# Patient Record
Sex: FEMALE | Race: WHITE | URBAN - METROPOLITAN AREA
[De-identification: names, ages, dates, MRNs, and addresses within clinical notes are randomized per-mention and may not be internally consistent; named-entity substitution may affect disease eponyms.]

---

## 2024-11-13 ENCOUNTER — TELEPHONE (OUTPATIENT)
Dept: TRANSPLANT | Facility: HOSPITAL | Age: 69
End: 2024-11-13
Payer: COMMERCIAL

## 2024-11-13 ENCOUNTER — DOCUMENTATION (OUTPATIENT)
Dept: TRANSPLANT | Facility: HOSPITAL | Age: 69
End: 2024-11-13
Payer: COMMERCIAL

## 2024-11-13 VITALS — HEIGHT: 65 IN | BODY MASS INDEX: 27.32 KG/M2 | WEIGHT: 164 LBS

## 2024-11-13 NOTE — PROGRESS NOTES
Do you have difficulty reading or writing in English?   no  Do you have any visual or hearing difficulties?  yes, Vision  Do you have transportation to and from your medical appointments?   yes  What type of transportation do you use?  Family(Sister, Nephew)  Who will be coming with you to your transplant appointments?  Sister  Are they able to drive?   no  How far can you walk?  1 or 2 Blocks  Do you use any assistive devices for walking? (Cane, walker, wheelchair, motorized scooter)  Rollator  Who is your primary care doctor?  Dr. Pee Morillo  Who is your kidney doctor?  Dr. Iggy Eaton  What is the primary cause of your kidney disease?  Diabetes  Are you currently on dialysis?   no  If yes, what days do you have your dialysis treatments?   N/A  How many years have you been on dialysis? N/A  Have you received a transplant before?   yes  If yes, what organ, and when and where was your transplant? Kidney- 2011- [facility].  Have you been diagnosed with diabetes?    yes  Do you currently have any open sores or wounds?   no  If yes, where?   N/A  Have you ever had any amputations?  no  If yes, where?   N/A  Do you have any history of blood clots in the legs or lungs?   no  If yes, any known family clotting disorders?   no  Have you ever had a blood transfusion?   yes  If yes, how many and when was the last one? Less than 5, Last one 2023.  Have you ever been diagnosed with Sickle Cell Disease?   no  Have you tested positive for hepatitis or HIV?   no  Have you ever been diagnosed with cancer?   yes  If yes, what type of cancer, and when and where were you treated?  Skin Cancer-2016 or 2017. Treated at [facility]  Do you have a history of a heart attack or stroke?   no  Are you currently using any supplemental oxygen?   no  Have you been hospitalized for any reason in the last year?   yes  If yes, what were you hospitalized for and where were you hospitalized?  8/24( Right side Pain) [facility] ([facility]  [place], [place]  Are you currently or have you previously been seen by a mental health professional?   no  If yes, what is the name of your mental health provider? N/A  Are you a current or former tobacco user?   no  Do you have history of alcohol abuse or dependence?   no  Do you have a history of illegal drug abuse or dependence?   no  Has anyone told you they're willing to donate their kidney to you?   no  Are you currently in evaluation or listed at another transplant center?   no  If yes, where? N/A  Comments:  Intake complete.  Pt. Pre- Dialysis  Referral Under Review.    PCP- Dr. Pee Morillo MD  [address], [place] [address] [phone]

## 2024-11-14 ENCOUNTER — TELEPHONE (OUTPATIENT)
Dept: TRANSPLANT | Facility: HOSPITAL | Age: 69
End: 2024-11-14
Payer: COMMERCIAL

## 2024-11-14 DIAGNOSIS — N18.6 ESRD (END STAGE RENAL DISEASE) (MULTI): Primary | ICD-10-CM

## 2024-11-26 ENCOUNTER — DOCUMENTATION (OUTPATIENT)
Dept: TRANSPLANT | Facility: HOSPITAL | Age: 69
End: 2024-11-26
Payer: COMMERCIAL

## 2024-11-26 NOTE — Clinical Note
Per EV [facility] Dual Plan &  Comm Plan [facility]CO active & pre dialysis.  Opened case with [employer]  & faxed clinical to  pending ref# [id number].

## 2024-11-26 NOTE — PROGRESS NOTES
Per EV [facility] Dual Plan &  Comm Plan MCO active & pre dialysis.  Opened case with [employer]  & faxed clinical to  pending ref# [id number].

## 2024-11-27 ENCOUNTER — TELEPHONE (OUTPATIENT)
Dept: TRANSPLANT | Facility: HOSPITAL | Age: 69
End: 2024-11-27

## 2024-11-27 NOTE — TELEPHONE ENCOUNTER
Confirmed kidney eval appt day for:12.4.24  Reviewed appointment plans for the day: YES  Challenges with transportation to appointments? NO  This could feel like a long day…Any concerns? NO  Bringing support person: YES

## 2024-12-04 ENCOUNTER — DOCUMENTATION (OUTPATIENT)
Dept: TRANSPLANT | Facility: HOSPITAL | Age: 69
End: 2024-12-04
Payer: COMMERCIAL

## 2024-12-04 ENCOUNTER — APPOINTMENT (OUTPATIENT)
Dept: TRANSPLANT | Facility: HOSPITAL | Age: 69
End: 2024-12-04
Payer: MEDICARE

## 2024-12-04 ENCOUNTER — DOCUMENTATION (OUTPATIENT)
Dept: TRANSPLANT | Facility: HOSPITAL | Age: 69
End: 2024-12-04

## 2024-12-04 ENCOUNTER — LAB (OUTPATIENT)
Dept: LAB | Facility: LAB | Age: 69
End: 2024-12-04
Payer: MEDICARE

## 2024-12-04 ENCOUNTER — OFFICE VISIT (OUTPATIENT)
Dept: TRANSPLANT | Facility: HOSPITAL | Age: 69
End: 2024-12-04
Payer: MEDICARE

## 2024-12-04 VITALS
HEART RATE: 79 BPM | OXYGEN SATURATION: 90 % | DIASTOLIC BLOOD PRESSURE: 57 MMHG | BODY MASS INDEX: 27.06 KG/M2 | WEIGHT: 162.6 LBS | TEMPERATURE: 94.8 F | SYSTOLIC BLOOD PRESSURE: 111 MMHG

## 2024-12-04 VITALS
SYSTOLIC BLOOD PRESSURE: 111 MMHG | OXYGEN SATURATION: 90 % | BODY MASS INDEX: 27.06 KG/M2 | DIASTOLIC BLOOD PRESSURE: 57 MMHG | WEIGHT: 162.6 LBS | TEMPERATURE: 94.8 F | HEART RATE: 79 BPM

## 2024-12-04 DIAGNOSIS — T86.12 FAILED KIDNEY TRANSPLANT (HHS-HCC): ICD-10-CM

## 2024-12-04 DIAGNOSIS — D63.1 ANEMIA IN ESRD (END-STAGE RENAL DISEASE) (MULTI): ICD-10-CM

## 2024-12-04 DIAGNOSIS — N18.6 ESRD (END STAGE RENAL DISEASE) (MULTI): Primary | ICD-10-CM

## 2024-12-04 DIAGNOSIS — Z01.818 PRE-TRANSPLANT EVALUATION FOR KIDNEY TRANSPLANT: Primary | ICD-10-CM

## 2024-12-04 DIAGNOSIS — N18.6 ESRD (END STAGE RENAL DISEASE) (MULTI): ICD-10-CM

## 2024-12-04 DIAGNOSIS — Z94.0 HISTORY OF KIDNEY TRANSPLANT (HHS-HCC): ICD-10-CM

## 2024-12-04 DIAGNOSIS — N18.6 ANEMIA IN ESRD (END-STAGE RENAL DISEASE) (MULTI): ICD-10-CM

## 2024-12-04 DIAGNOSIS — Z01.818 PRE-TRANSPLANT EVALUATION FOR KIDNEY TRANSPLANT: ICD-10-CM

## 2024-12-04 PROBLEM — I73.9 PERIPHERAL VASCULAR DISEASE (CMS-HCC): Status: ACTIVE | Noted: 2018-12-20

## 2024-12-04 PROBLEM — R60.0 BILATERAL LOWER EXTREMITY EDEMA: Status: ACTIVE | Noted: 2023-11-24

## 2024-12-04 PROBLEM — E11.51 TYPE 2 DIABETES MELLITUS WITH PERIPHERAL ANGIOPATHY: Status: ACTIVE | Noted: 2018-09-20

## 2024-12-04 PROBLEM — I10 ESSENTIAL HYPERTENSION, MALIGNANT: Status: ACTIVE | Noted: 2018-12-20

## 2024-12-04 PROBLEM — E78.5 HLD (HYPERLIPIDEMIA): Status: ACTIVE | Noted: 2024-07-19

## 2024-12-04 PROBLEM — B35.1 ONYCHOMYCOSIS: Status: ACTIVE | Noted: 2018-09-20

## 2024-12-04 PROBLEM — N25.81 SECONDARY HYPERPARATHYROIDISM OF RENAL ORIGIN (MULTI): Status: ACTIVE | Noted: 2022-06-24

## 2024-12-04 PROBLEM — N18.5 ANEMIA DUE TO STAGE 5 CHRONIC KIDNEY DISEASE TREATED WITH ERYTHROPOIETIN (MULTI): Status: ACTIVE | Noted: 2024-09-13

## 2024-12-04 PROBLEM — I99.8 VASCULAR INSUFFICIENCY: Status: ACTIVE | Noted: 2018-12-20

## 2024-12-04 PROBLEM — R55 SYNCOPE AND COLLAPSE: Status: ACTIVE | Noted: 2024-03-14

## 2024-12-04 LAB
ABO GROUP (TYPE) IN BLOOD: NORMAL
ALBUMIN SERPL BCP-MCNC: 4 G/DL (ref 3.4–5)
ALP SERPL-CCNC: 70 U/L (ref 33–136)
ALT SERPL W P-5'-P-CCNC: 23 U/L (ref 7–45)
AMYLASE SERPL-CCNC: 41 U/L (ref 29–103)
APPEARANCE UR: CLEAR
AST SERPL W P-5'-P-CCNC: 24 U/L (ref 9–39)
BILIRUB DIRECT SERPL-MCNC: 0.2 MG/DL (ref 0–0.3)
BILIRUB SERPL-MCNC: 0.8 MG/DL (ref 0–1.2)
BILIRUB UR STRIP.AUTO-MCNC: NEGATIVE MG/DL
BUN SERPL-MCNC: 75 MG/DL (ref 6–23)
C PEPTIDE SERPL-MCNC: 5.8 NG/ML (ref 0.7–3.9)
CHOLEST SERPL-MCNC: 111 MG/DL (ref 0–199)
CHOLESTEROL/HDL RATIO: 2.4
CMV IGG AVIDITY SERPL IA-RTO: NONREACTIVE %
COLOR UR: COLORLESS
CREAT SERPL-MCNC: 6.82 MG/DL (ref 0.5–1.05)
EBV EA IGG SER QL: POSITIVE
EBV NA AB SER QL: POSITIVE
EBV VCA IGG SER IA-ACNC: POSITIVE
EBV VCA IGM SER IA-ACNC: POSITIVE
EGFRCR SERPLBLD CKD-EPI 2021: 6 ML/MIN/1.73M*2
ERYTHROCYTE [DISTWIDTH] IN BLOOD BY AUTOMATED COUNT: 13.6 % (ref 11.5–14.5)
GLUCOSE UR STRIP.AUTO-MCNC: ABNORMAL MG/DL
HBV CORE AB SER QL: NONREACTIVE
HBV SURFACE AB SER-ACNC: 132 MIU/ML
HBV SURFACE AG SERPL QL IA: NONREACTIVE
HCT VFR BLD AUTO: 36.5 % (ref 36–46)
HCV AB SER QL: NONREACTIVE
HCYS SERPL-SCNC: 27.81 UMOL/L (ref 5–13.9)
HDLC SERPL-MCNC: 45.9 MG/DL
HGB BLD-MCNC: 10.6 G/DL (ref 12–16)
HIV 1+2 AB+HIV1 P24 AG SERPL QL IA: NONREACTIVE
HOLD SPECIMEN: NORMAL
INR PPP: 0.9 (ref 0.9–1.1)
KETONES UR STRIP.AUTO-MCNC: NEGATIVE MG/DL
LEUKOCYTE ESTERASE UR QL STRIP.AUTO: NEGATIVE
MCH RBC QN AUTO: 28.9 PG (ref 26–34)
MCHC RBC AUTO-ENTMCNC: 29 G/DL (ref 32–36)
MCV RBC AUTO: 100 FL (ref 80–100)
NITRITE UR QL STRIP.AUTO: NEGATIVE
NON-HDL CHOLESTEROL: 65 MG/DL (ref 0–149)
NRBC BLD-RTO: 0 /100 WBCS (ref 0–0)
PH UR STRIP.AUTO: 7 [PH]
PHOSPHATE SERPL-MCNC: 7.8 MG/DL (ref 2.5–4.9)
PLATELET # BLD AUTO: 222 X10*3/UL (ref 150–450)
PROT SERPL-MCNC: 8.5 G/DL (ref 6.4–8.2)
PROT UR STRIP.AUTO-MCNC: ABNORMAL MG/DL
PROTHROMBIN TIME: 9.6 SECONDS (ref 9.8–12.8)
RBC # BLD AUTO: 3.67 X10*6/UL (ref 4–5.2)
RBC # UR STRIP.AUTO: NEGATIVE /UL
RBC #/AREA URNS AUTO: NORMAL /HPF
RH FACTOR (ANTIGEN D): NORMAL
SP GR UR STRIP.AUTO: 1.01
SQUAMOUS #/AREA URNS AUTO: NORMAL /HPF
TREPONEMA PALLIDUM IGG+IGM AB [PRESENCE] IN SERUM OR PLASMA BY IMMUNOASSAY: NONREACTIVE
UROBILINOGEN UR STRIP.AUTO-MCNC: NORMAL MG/DL
VARICELLA ZOSTER IGG INDEX: 7.9 IA
VZV IGG SER QL IA: POSITIVE
WBC # BLD AUTO: 6.8 X10*3/UL (ref 4.4–11.3)
WBC #/AREA URNS AUTO: NORMAL /HPF

## 2024-12-04 PROCEDURE — 99213 OFFICE O/P EST LOW 20 MIN: CPT | Performed by: TRANSPLANT SURGERY

## 2024-12-04 PROCEDURE — 86829 HLA CLASS I/II ANTIBODY QUAL: CPT | Mod: OUT | Performed by: STUDENT IN AN ORGANIZED HEALTH CARE EDUCATION/TRAINING PROGRAM

## 2024-12-04 PROCEDURE — 86825 HLA X-MATH NON-CYTOTOXIC: CPT | Mod: OUT | Performed by: STUDENT IN AN ORGANIZED HEALTH CARE EDUCATION/TRAINING PROGRAM

## 2024-12-04 PROCEDURE — 81379 HLA I TYPING COMPLETE HR: CPT | Mod: OUT | Performed by: STUDENT IN AN ORGANIZED HEALTH CARE EDUCATION/TRAINING PROGRAM

## 2024-12-04 PROCEDURE — 99215 OFFICE O/P EST HI 40 MIN: CPT | Performed by: INTERNAL MEDICINE

## 2024-12-04 PROCEDURE — 81382 HLA II TYPING 1 LOC HR: CPT | Mod: OUT | Performed by: STUDENT IN AN ORGANIZED HEALTH CARE EDUCATION/TRAINING PROGRAM

## 2024-12-04 RX ORDER — INSULIN LISPRO-AABC 100 [IU]/ML
INJECTION, SOLUTION SUBCUTANEOUS
COMMUNITY
Start: 2023-12-02

## 2024-12-04 RX ORDER — LOSARTAN POTASSIUM 100 MG/1
50 TABLET ORAL
COMMUNITY
Start: 2023-01-01

## 2024-12-04 RX ORDER — ELECTROLYTES/DEXTROSE
SOLUTION, ORAL ORAL
COMMUNITY

## 2024-12-04 RX ORDER — ALBUTEROL SULFATE 90 UG/1
AEROSOL, METERED RESPIRATORY (INHALATION)
COMMUNITY

## 2024-12-04 RX ORDER — DENOSUMAB 60 MG/ML
INJECTION SUBCUTANEOUS
COMMUNITY
Start: 2023-12-02

## 2024-12-04 RX ORDER — CYCLOSPORINE 25 MG/1
CAPSULE, GELATIN COATED ORAL
COMMUNITY

## 2024-12-04 RX ORDER — CHOLECALCIFEROL (VITAMIN D3) 25 MCG
4000 TABLET ORAL
COMMUNITY

## 2024-12-04 RX ORDER — INSULIN LISPRO-AABC 100 [IU]/ML
INJECTION, SOLUTION INTRAVENOUS; SUBCUTANEOUS
COMMUNITY

## 2024-12-04 RX ORDER — HYDRALAZINE HYDROCHLORIDE 25 MG/1
25 TABLET, FILM COATED ORAL 3 TIMES DAILY
COMMUNITY
Start: 2024-08-15

## 2024-12-04 RX ORDER — CARVEDILOL 12.5 MG/1
1 TABLET ORAL
COMMUNITY
Start: 2024-02-16

## 2024-12-04 RX ORDER — INSULIN LISPRO 100 [IU]/ML
INJECTION, SOLUTION INTRAVENOUS; SUBCUTANEOUS
COMMUNITY

## 2024-12-04 RX ORDER — LEVOTHYROXINE SODIUM 75 UG/1
TABLET ORAL
COMMUNITY
Start: 2024-10-21

## 2024-12-04 RX ORDER — FUROSEMIDE 40 MG/1
1 TABLET ORAL
COMMUNITY
Start: 2024-02-24

## 2024-12-04 ASSESSMENT — ENCOUNTER SYMPTOMS
GASTROINTESTINAL NEGATIVE: 1
MUSCULOSKELETAL NEGATIVE: 1
SHORTNESS OF BREATH: 0
FATIGUE: 1
FREQUENCY: 0
CHEST TIGHTNESS: 0

## 2024-12-04 ASSESSMENT — PAIN SCALES - GENERAL
PAINLEVEL_OUTOF10: 0-NO PAIN
PAINLEVEL_OUTOF10: 0-NO PAIN

## 2024-12-04 NOTE — PROGRESS NOTES
History of Present Illness  Lacy Mora is a 69 y.o. woman who presents for kidney transplant evaluation visit. She is currently on dialysis.  The cause of kidney disease is  graft failure after prior transplant/diabetes .  She does not have a history of heart attack. She  does not have a history of malignancy. Lacy Mora's overall functional status is Fair.  She presents using a walker but is able to ambulate independently. She lives with her sister.  She is currently in HD via tunnelled line.  Previously transplanted at [facility] in 2016.      Patient Active Problem List   Diagnosis    Anemia due to stage 5 chronic kidney disease treated with erythropoietin (Multi)    Anemia    Bilateral lower extremity edema    EBV (Natalia-Barr virus) viremia    ESRD (end stage renal disease) (Multi)    Essential hypertension, malignant    History of kidney transplant ([facility])    HLD (hyperlipidemia)    HTN (hypertension)    Peripheral vascular disease (CMS-HCC)    Immunosuppressed status    Onychomycosis    Secondary hyperparathyroidism of renal origin (Multi)    Syncope and collapse    Type 2 diabetes mellitus with complication (Multi)    Type 2 diabetes mellitus with peripheral angiopathy    Vascular insufficiency       Review of Systems   Constitutional:  Positive for fatigue.   Respiratory:  Negative for chest tightness and shortness of breath.    Cardiovascular:  Negative for leg swelling.   Gastrointestinal: Negative.    Genitourinary:  Negative for frequency.   Musculoskeletal: Negative.         Objective   /57   Pulse 79   Temp 34.9 °C (94.8 °F) (Temporal)   Wt 73.8 kg (162 lb 9.6 oz)   SpO2 90%   BMI 27.06 kg/m²   Physical Exam  Constitutional:       Appearance: She is normal weight.   HENT:      Head: Normocephalic.   Cardiovascular:      Rate and Rhythm: Normal rate.      Pulses: Normal pulses.   Pulmonary:      Effort: Pulmonary effort is normal.   Abdominal:      Palpations: Abdomen is soft.  "  Musculoskeletal:         General: No swelling.      Comments: Slow but steady gait.    Skin:     General: Skin is warm.   Neurological:      Mental Status: She is alert.   Psychiatric:         Mood and Affect: Mood normal.        Lab Review    Blood Type: No results found for: \"ABORH\"  Lab Results   Component Value Date    CREATININE 6.09 (H) 09/26/2023    K 4.8 09/26/2023    GLUCOSE 180 (H) 09/26/2023    HCT 37.8 09/26/2023    WBC 8.1 09/26/2023     09/26/2023    CALCIUM 10.3 09/26/2023         Cardiographics  ECHO: Yes    Date: 8/14 CONCLUSIONS:   - Exam indication: Chest Pain   - The left ventricle is normal in size. There is mild concentric left ventricular   hypertrophy. Left ventricular systolic function is normal. EF = 63 ± 5% (2D   biplane) Normal left ventricular diastolic function. No RWMA   - The right ventricle is normal in size. Right ventricular systolic function is   normal.   - The left atrial cavity is mildly dilated.   STRESS TEST; Yes    Date: 8/15  CONCLUSIONS:    1. SPECT Perfusion Study: Normal.    2. There is no scintigraphic evidence for inducible ischemia.    3. No evidence of scarred myocardium.    4. Left ventricle is small. The left ventricle systolic function is   normal.    5. This is a low risk scan.     Assessment/Plan    Diagnoses: ESRD requiring renal replacement therapy.     Lacy Mora is a potentially acceptable candidate for kidney transplantation. Will need frailty assessmn   She will need the following tests prior to listing     Cardiac: No  Malignancy screening: Yes - routine  Six minute walk: Yes - asses strengtj  CT Scan: Yes - assess targets  Screening Chest CT: No    Advantages and disadvantages of transplantation were reviewed. Discussed the operative procedure and potential complications, particularly in regard to the need for re-operation. I addressed issues of post-operative recovery and follow-up. Lastly, I discussed the need for immunosuppressive therapy " and the risk associated with this treatment.  I reviewed the risks of various donor organs including transmission of disease and need for dialysis after transplant. The patient consents to receive offers from high KDPI (Yes), hepatitis B core +  (Yes), Hepatitis C+ (Yes) donors. Current center SRTR results were provided to the patient. I answered all of her questions to the best of my ability.     She appears to have good understanding of her medical condition and the transplant process.    Jose Valladares MD  Professor of Surgery    I spent 40 minutes on the professional care of this patient including record review, physical exam, patient counseling, and documentation.

## 2024-12-04 NOTE — PATIENT INSTRUCTIONS
Thank You for coming to [facility].  You are currently in evaluation for kidney transplant.  In order to be eligible to be placed on the wait list you must complete certain tests and appointments.  The following tests/appointments will be scheduled for you:    []   EKG  [x]   Echocardiogram  []  Stress test  [x]  CT Abdomen and Pelvis  [x]  CT cardiac score  []  Chest x-ray    []  Nephrology appointment  []  Surgeon appointment  [x]  Social work appointment  [x]  Cardiology consultation  [x]   Financial counselor telephone appointment  [x]  Dietician appointment (optional)    Please complete the following testing with your primary care doctor:    [x] CXR  [x]  6 minutes walking test  [x]  Cancer screening       Please call Raeann Rojas RN, Pre-Kidney Transplant Coordinator, at [phone] if you have any questions.

## 2024-12-04 NOTE — PROGRESS NOTES
Kidney Patient Summary    Date of appointment: 2024    Name: Lacy Mora    : 1955    MRN: [id number]    Diagnosis: Diabetes Mellitus - Type II    ABO: No results found for requested labs within last 1825 days.     Phase: Referral  Status: Active      Last Seen:   Referring Nephrologist: Pieter Eaton MD     HD Unit: (Not currently on dialysis)   Dialysis Start:   Access:   R- AV fistula     Days:      PCP: Dr. Pee Morillo       Endocrinologist:     BMI Readings from Last 1 Encounters:   24 27.29 kg/m²     Blood Transfusion: YES   Medical History/Hospitalizations:   PAST MEDICAL HISTORY  No date: CKD (chronic kidney disease)  No date: Diabetes mellitus (HCC)  No date: Diabetic neuropathy (HCC)  No date: Disorder of thyroid  2024: ESRD (end stage renal disease) (HCC)  No date: Glaucoma  : H/O kidney transplant  No date: High cholesterol  2024: HLD (hyperlipidemia)  2024: HTN (hypertension)  No date: Osteoporosis  No date: PONV (postoperative nausea and vomiting)  No date: Walker as ambulation aid    Surgical History:   PAST SURGICAL HISTORY  2011: APPENDECTOMY  2009: BX OF BREAST; INCISIONAL; Right  2015: PAST SURGICAL HISTORY OF; Right      Comment:  LOWER LEG SKIN GRAFT  2024: PAST SURGICAL HISTORY OF      Comment:  fistula right arm  : REMV CATARACT EXTRACAP,INSERT LENS; Bilateral  2011: TRANSPLANTATION OF KIDNEY; Right    Donors: No    Staff:  Nephrologist:    Surgeon:     :      Testing Date:     Serologies:    CMV:     No results found for requested labs within last 365 days.  EBV Panel:  ROSANGELA-PARTIDA VCA IGG:   No results found for requested labs within last 365 days.   ROSANGELA-PARTIDA VCA IGM: No results found for requested labs within last 365 days.   EBV EARLY ANTIGEN ANTIBODY, IGG: No results found for requested labs within last 365 days.   EPSTIEN-BARR NUCLEAR ANTIGEN AB: No results found for requested labs within last 365 days.    Tox:    AMPHETAMINE SCREEN BLOOD: No results found for requested labs within last 365 days.   METHAMPHETAMINE, BLOOD, SCREEN: No results found for requested labs within last 365 days.   BENZODIAZEPINES SCREEN BLOOD: No results found for requested labs within last 365 days.   BUPRENORPHINE SCREEN BLOOD: No results found for requested labs within last 365 days.   CANNABINOIDS SCREEN BLOOD: No results found for requested labs within last 365 days.   COCAINE SCREEN BLOOD: No results found for requested labs within last 365 days.   METHADONE SCREEN BLOOD: No results found for requested labs within last 365 days.   OXYCODONE SCREEN BLOOD: No results found for requested labs within last 365 days.   PHENCYCLIDINE SCREEN BLOOD: No results found for requested labs within last 365 days.   OPIATE SCREEN BLOOD: No results found for requested labs within last 365 days.   DRUG SCREEN COMMENT BLOOD: No results found for requested labs within last 365 days.   BARBITURATE SCREEN BLOOD: No results found for requested labs within last 365 days.   Cotinine: No results found for requested labs within last 365 days.  HBV ag:   No results found for requested labs within last 365 days.  HBV ab:   No results found for requested labs within last 365 days.  HBV c:     No results found for requested labs within last 365 days.  HCV:        No results found for requested labs within last 365 days.  HIV:    No results found for requested labs within last 365 days.  RPR:    No results found for requested labs within last 365 days.  TSpot:   No results found for requested labs within last 365 days.   VZV:   VARICELLA ZOSTER IGG: No results found for requested labs within last 365 days.   VARICELLA ZOSTER IGG INDEX: No results found for requested labs within last 365 days.   A1C:       HEMOGLOBIN A1C/HEMOGLOBIN TOTAL IN BLOOD: 6.1   ESTIMATED AVERAGE GLUCOSE FOR HBA1C: 128   CPep:  No results found for requested labs within last 365 days.  PSA:     No results found for requested labs within last 365 days.  Other:     Test/Consult Impression Next Scheduled Date   CXR XR chest 2 views    Result Date: 8/13/2024  IMPRESSION: No acute radiographic abnormality. : SARAH   Transcribe Date/Time: Aug 13 2024  2:03P Dictated by : CHRISSY PRETTY MD This examination was interpreted and the report reviewed and electronically signed by: CHRISSY PRETTY MD on Aug 13 2024  2:04PM  EST       EKG No results found for this or any previous visit from the past 1095 days.      Echo No results found.     CT Cardiac Score No results found.     NM Stress Test- 8/15/24 CONCLUSIONS:    1. SPECT Perfusion Study: Normal.    2. There is no scintigraphic evidence for inducible ischemia.    3. No evidence of scarred myocardium.    4. Left ventricle is small. The left ventricle systolic function is   normal.    5. This is a low risk scan.      Cardiac MRI No results found.     Left Heart Catheterization No results found.     Cardiology last visit impression      Pulmonary Function Test No results found for this or any previous visit.    CT Abd/Pelvis No results found.     Colonoscopy  No orders found for this or any previous visit.    Pap No results found for requested labs within last 1825 days.  No results found for requested labs within last 1825 days.    Mammogram No results found.     Other:

## 2024-12-04 NOTE — PROGRESS NOTES
TRANSPLANT NEPHROLOGY CONSULT :   KIDNEY TRANSPLANT RECIPIENT EVALUATION        SERVICE DATE: 12/04/2024     REASON FOR CONSULT/CHIEF COMPLAINT:    FOR KIDNEY TRANSPLANT RECIPIENT EVALUATION.    HPI:    Ms. Mora is a 69 y.o. female with past medical history significant for :    ESRD secondary to DM/HTN, Failed transplant. On HD MWF, using LIJ PERMACATH. RUE - not ready to use. Edw 166 lBS.     Hx of kidney transplant at [facility] in [id number] (failed 8/2024)    DM X 2007; On insulin. Noted retinopathy, but she denied hx of neuropath  HTN x 2007  HLD  Hypothyroid  Anemia  osteoporosis    BLOOD TYPE:  PENDING    Functional status :   Walks with a rollator walker, 2-3 city blocks and can walk upstairs to 2nd floor    Urine output :   The patient makes urine output approximately 500 cc per day.    Potential Donor :  NOT At this time    Last GFR /Creatinine:   NA  On dialysis    Hx of PRBC Transfusion  Yes    Pregnancy History  Denied    Recent Hospitalization/ED visit:  8/15/24- HTN urgency/emergency with chest pain    The patient is here for kidney transplant recipient evaluation. Ms. Mroa has had multiple complications from end stage severe renal disease including anemia, secondary hyperparathyroidism, and osteodystrophy. The patient is here today for an evaluation for kidney transplantation to improve quality of life and decrease the risk of cardiovascular disease, coronary artery disease and stroke.     The patient is doing well without complaints. Denied chest pain, shortness of breath, palpitation, dyspnea on exertion, dysuria, fever, nausea, vomiting, diarrhea and flu-liked symptoms. No swelling of the extremities. No recent hospitalization or ED visit.      ROS:  Review of  14 systems was performed system by system. See HPI. Otherwise, the symptoms were negative.    PAST MEDICAL HISTORY: Please see HPI.      PAST SURGICAL HISTORY: Please see HPI.      SOCIAL HISTORY: Please see our 's note  for details.    Social History     Socioeconomic History    Marital status: Unknown     Spouse name: Not on file    Number of children: Not on file    Years of education: Not on file    Highest education level: Not on file   Occupational History    Not on file   Tobacco Use    Smoking status: Not on file    Smokeless tobacco: Not on file   Substance and Sexual Activity    Alcohol use: Not on file    Drug use: Not on file    Sexual activity: Not on file   Other Topics Concern    Not on file   Social History Narrative    Not on file     Social Drivers of Health     Financial Resource Strain: Not on file   Food Insecurity: No Food Insecurity (8/14/2024)    Received from [facility]    Hunger Vital Sign     Worried About Running Out of Food in the Last Year: Never true     Ran Out of Food in the Last Year: Never true   Transportation Needs: No Transportation Needs (8/14/2024)    Received from [facility]    PRAPARE - Transportation     Lack of Transportation (Medical): No     Lack of Transportation (Non-Medical): No   Physical Activity: Not on file   Stress: Not on file   Social Connections: Not on file   Intimate Partner Violence: Not on file   Housing Stability: Unknown (8/14/2024)    Received from [facility]    Housing Stability Vital Sign     Unable to Pay for Housing in the Last Year: No     Number of Places Lived in the Last Year: Not on file     Unstable Housing in the Last Year: No       FAMILY HISTORY:  No family history on file.    MEDICATION LIST:  Current Outpatient Medications   Medication Instructions    aspirin 81 mg, Daily RT    carvedilol (Coreg) 12.5 mg tablet 1 tablet, Every 12 hours scheduled (0630,1830)    cholecalciferol (VITAMIN D-3) 4,000 Units, oral, Daily RT    cycloSPORINE (SandIMMUNE) 25 mg capsule take 2 capsules by mouth IN THE MORNING then take 1 capsule by mouth IN THE EVENING    furosemide (Lasix) 40 mg tablet 1 tablet, Daily (0630)    hydrALAZINE (APRESOLINE) 25 mg, 3  times daily    insulin lispro (Admelog SoloStar U-100 Insulin) 100 unit/mL injection Inject by subcutaneous route.    insulin lispro-aabc (Lyumjev U-100 Insulin) 100 unit/mL solution injection    levothyroxine (Synthroid, Levoxyl) 75 mcg tablet     losartan (COZAAR) 50 mg, Daily RT    Lyumjev KwikPen U-100 Insulin 100 unit/mL insulin pen inject 7-8 units subcutaneously three times a day ROTATE INJECTION SITES    magnesium chloride 64 mg magnesium tablet     Prolia 60 mg/mL syringe Inject 1 mL by subcutaneous route as directed.    Ventolin HFA 90 mcg/actuation inhaler INHALE TWO PUFFS BY MOUTH THREE TIMES A DAY       ALLERGY  Not on File    PHYSICAL EXAM:    Visit Vitals  /57   Pulse 79   Temp 34.9 °C (94.8 °F) (Temporal)   Wt 73.8 kg (162 lb 9.6 oz)   SpO2 90%   BMI 27.06 kg/m²   BSA 1.84 m²        General Appearance - NAD, Good speech, oriented and alert  HEENT - Supple. Not pale. No jaundice. No cervical lymphadenopathy. Pharynx and tonsils are not injected.  CVS - RRR. Normal S1/S2. No murmur, click , rub or gallop  Lungs- clear to auscultation bilaterally  Abdomen - soft , not tender, no guarding, no rigidity. No hepatosplenomegaly. Normal bowel sounds. No masses and ascites.   Musculoskeletal /Extremities - no edema. Full ROM. No joint tenderness.   Neuro/Psych - appropriate mood and affect. Motor power V/V all extremities. CN I -XII were grossly intact.  Skin - No visible rash  Dialysis access :  LIJ Permacath is clean, dry and intact. No signs of infection.    LABS:    Lab Results   Component Value Date    HGBA1C 6.1 (H) 10/18/2024     par    EKG:  Reviewed    Echocardiogram:   No results found for this or any previous visit from the past 1825 days.        ASSESSMENT AND PLAN:    After completion of taking history and physical examination, the patient seems to be a marginal candidate to proceed with the rest of transplant evaluation.    However, patient will need the following tests to determine the  eligibility for kidney transplant per [facility]'s kidney transplant evaluation guideline :    - 6 MWT  - Consider stopping Sirolimus, and weaning cyclosporine ( noted hirsutism and skin cancer at her face 2016)  -cardiac clearance per protocol  - CT A/P    -Update cancer screening per age/sex  - Will need to complete the rest of work up per protocol      =========================================================================    The case will be presented at the selection committee at the [facility].  The final decision from the committee will be sent out to notify the patient/primary care physician/ nephrologist. The above recommendations were discussed with the patient at length.     In addition, the following were also discussed:    - Risks and benefits of transplantation, both short-term and long-term    - Risk of primary graft non-function, DGF, SGF, rejection, primary disease recurrence, return to dialysis    - Risks of immunosuppression including infections, CA, CV risk    - Need for compliance with medications and medical care in general    - We reviewed the necessity of HBV vaccination and other recommended vaccines before a kidney transplant, following the Centers for Disease Control and Prevention(CDC)'s guidelines [https://www.cdc.gov/vaccines/schedules/downloads/adult/adult-combined-schedule.pdf]     Currently, the patient has received the following vaccines:    Immunization History   Administered Date(s) Administered    Moderna SARS-CoV-2 Vaccination 03/09/2021, 04/06/2021, 06/07/2022         The patient expressed understanding of the above and wishes to proceed.  I answered all of his questions. I urged the patient to look for living donors.    - I have spent over 60 minutes with the patient, reviewing medical record, lab result , CXR result and other specialty's notes. More than 50% of the time was spent in counseling, explaining about the  transplantation and answering the questions. I also reviewed the medical record, blood test results, imaging and previous studies which were obtained from the nephrologists. I also order the tests needed to complete the evaluation and I will review the results of those tests.    Thank you for this consultation. Please feel free to contact me for questions.      Janet Koehler    Transplant Nephrology

## 2024-12-04 NOTE — PROGRESS NOTES
Patient attended class on 12/04/24.  Accompanied to class with female and male support person.  Oral and written education was provided.  Patient remained attentive throughout the review session, asking appropriate questions.  Evaluation consents were signed.     PRE-TRANSPLANT EDUCATION  Patient received education regarding the following topics as part of their pre-transplant evaluation:  The evaluation process, including:   Transplant team members and roles    Required consultations and testing   Selection criteria and suitability for transplant   Listing process and receiving an organ offer   Psychosocial and financial considerations for a successful transplant   Patient responsibilities, including the necessity of adhering to a strict medical regimen  An overview of the surgical procedure   Potential medical, surgical, and psychosocial risks to transplantation, including:   Wound infection   Pneumonia   Blood clot formation   Organ rejection, failure, and possibility of re-transplantation   Lifetime immunosuppression therapy and associated risks   Arrhythmias and cardiovascular collapse   Multi-organ system failure   Death   Depression   Post-Traumatic Stress Disorder   Generalized anxiety, issues of dependence, and feelings of guilt  Available alternatives to transplantation  Donor risk factors that could affect the success of the transplant and the health of the patient, including:   Donor age   Donor medical and social history   Condition of the organ   Risk of rachel cancer, HIV, Hepatitis B, Hepatitis C, or malaria if the infection is not detectable at the time of donation  Patient?s right to withdraw consent for transplantation at any time during the process  Transplants not performed in a Medicare-approved transplant center could affect the patient?s ability to have immunosuppression medication paid for under Medicare part B.   Multiple listing options.    Patient was given the opportunity to have  questions answered. Patient was provided a copy of the informed consent for transplant evaluation.    Signed evaluation informed consent received? 12/04/24

## 2024-12-04 NOTE — PROGRESS NOTES
Eval Clinic Note:  Patient attended evaluation appts on 12/4/2024 with Dr. Koehler and Dr. Valladares  Medications and allergies reviewed with patient.  Patient presented to appointment with nephew.  Patient ambulated independently/used a walker.    History:  Patient has a history of DM, ESRD, H/O kidney transplant, HDL, HTN, Appendectomy.   Dialysis: HD on M-W-F, ACCESS Right AVF, since 11/2024.    Testing completed recently: chest xray, stress test.   Testing needed for evaluation:  See AVS  Listing Consent: Yes KDPI >85%, DCD, Hep C, Hep BYes  Comments:  Provided patient with my contact info for questions and concerns.       LISTING EDUCATION    Patient educated regarding the following prior to placement on the transplant waiting list:   The patient?s medical condition, prognosis, and treatment plan.   The expectations and patient responsibilities while on the waiting list, including:   Keeping the transplant center informed of any changes in contact information or insurance coverage   Notifying the transplant center of any changes in medical status   Required testing and/or re-evaluation appointments while awaiting transplant   An overview of the surgical procedure, including potential risks and alternatives.   Information regarding what to expect during the inpatient admission and recovery period.   A discussion regarding organ offers and types of potential donors, including potential risks that may be associated with specific types of donors that could affect the success of the transplant or the health of the patient.  The right to refuse transplantation.     Patient was given the opportunity to have questions answered. Patient was provided a copy of the informed consent for transplant listing.    Education provided by:  Transplant Coordinator: GALLO Boo RN  Transplant Physician: Dr. Zakia MD    Signed listing informed consent received? YES/NO  Patient agrees to be listed for the following:  KDPI > 85%  Has patient in physical therapy for this yet?  If not, please order as this may be the most beneficial for the patient and her symptoms  Okay to order MRI without contrast for further evaluation  Dx left-sided sciatica    Please follow plan of care above and then see me as scheduled on 9/6/2024 [Yes]  Donors After Circulatory Death (DCD) [Yes]  Donors with a Positive Core Antibody for Hepatitis B [Yes]  Donors with Hepatitis C Virus to recipients with hepatitis C [X]  Donors with Hepatitis C Virus to Negative hepatitis C recipients [Yes]    Patient will be discussed at an upcoming selection committee to determine eligibility to be placed on the [facility] waiting list.

## 2024-12-05 ENCOUNTER — DOCUMENTATION (OUTPATIENT)
Dept: TRANSPLANT | Facility: HOSPITAL | Age: 69
End: 2024-12-05
Payer: COMMERCIAL

## 2024-12-05 ENCOUNTER — LAB REQUISITION (OUTPATIENT)
Dept: LAB | Facility: CLINIC | Age: 69
End: 2024-12-05
Payer: COMMERCIAL

## 2024-12-05 DIAGNOSIS — N18.6 END STAGE RENAL DISEASE (MULTI): ICD-10-CM

## 2024-12-05 LAB
FLOW AUTOCROSSMATCH: NORMAL
HLA CLASS I AB SCREEN,FC: NORMAL
HLA CLASS II AB SCREEN,FC: NORMAL
HLA CLS I TYP PNL BLD/T DONR HIGH RES: NORMAL
HLA RESULTS: NORMAL
HLA-DP2 QL: NORMAL
HLA-DQB1 HIGH RES: NORMAL
HLA-DRB1 HIGH RES: NORMAL

## 2024-12-05 NOTE — Clinical Note
Per 12/03/24 glenn/ronan from [facility] of [facility]  [id number] kidney txp eval & listing approved under Ref# [id number] eff 07/12/23 with no exp date

## 2024-12-05 NOTE — PROGRESS NOTES
Per 12/03/24 glenn/ronan from [facility]  [id number] kidney txp eval & listing already approved under Ref# [id number] eff 07/12/23 with no exp date so the new request has been cancelled.  Precert needed at  at time of txp.

## 2024-12-06 LAB
NIL(NEG) CONTROL SPOT COUNT: NORMAL
PANEL A SPOT COUNT: 0
PANEL B SPOT COUNT: 0
POS CONTROL SPOT COUNT: NORMAL
T-SPOT. TB INTERPRETATION: NEGATIVE

## 2024-12-07 LAB
AMPHETAMINES SERPL QL SCN: NEGATIVE NG/ML
ANNOTATION COMMENT IMP: NORMAL
BARBITURATES SERPL QL SCN: NEGATIVE NG/ML
BENZODIAZ SERPL QL SCN: NEGATIVE NG/ML
BUPRENORPHINE SERPL-MCNC: NEGATIVE NG/ML
CANNABINOIDS SERPL QL SCN: NEGATIVE NG/ML
COCAINE SERPL QL SCN: NEGATIVE NG/ML
COTININE SERPL-MCNC: <5 NG/ML
METHADONE SERPL QL SCN: NEGATIVE NG/ML
METHAMPHET SERPL QL: NEGATIVE NG/ML
NICOTINE SERPL-MCNC: <5 NG/ML
OPIATES SERPL QL SCN: NEGATIVE NG/ML
OXYCODONE SERPL QL: NEGATIVE NG/ML
PCP SERPL QL SCN: NEGATIVE NG/ML

## 2024-12-10 LAB
FLOW AUTOCROSSMATCH: NORMAL
HLA CLASS I AB SCREEN,FC: NORMAL
HLA CLASS II AB SCREEN,FC: NORMAL
HLA RESULTS: NORMAL
HLA RESULTS: NORMAL

## 2024-12-14 LAB
HLA CLS I TYP PNL BLD/T DONR HIGH RES: NORMAL
HLA RESULTS: NORMAL
HLA-DP2 QL: NORMAL
HLA-DQB1 HIGH RES: NORMAL
HLA-DRB1 HIGH RES: NORMAL

## 2024-12-30 DIAGNOSIS — Z01.818 PRE-TRANSPLANT EVALUATION FOR KIDNEY TRANSPLANT: Primary | ICD-10-CM

## 2025-01-02 ENCOUNTER — TELEPHONE (OUTPATIENT)
Dept: TRANSPLANT | Facility: HOSPITAL | Age: 70
End: 2025-01-02
Payer: COMMERCIAL

## 2025-01-07 ENCOUNTER — DOCUMENTATION (OUTPATIENT)
Dept: TRANSPLANT | Facility: HOSPITAL | Age: 70
End: 2025-01-07
Payer: COMMERCIAL

## 2025-01-07 ENCOUNTER — APPOINTMENT (OUTPATIENT)
Facility: HOSPITAL | Age: 70
End: 2025-01-07
Payer: COMMERCIAL

## 2025-01-07 ENCOUNTER — OTHER (OUTPATIENT)
Facility: HOSPITAL | Age: 70
End: 2025-01-07
Payer: COMMERCIAL

## 2025-01-07 NOTE — PROGRESS NOTES
[facility] dual complete policy active for 2025. Per 12/03/24 v/m from [facility]  [id number] kidney txp eval & listing already approved under Ref# [id number] eff 07/12/23

## 2025-01-09 ENCOUNTER — HOSPITAL ENCOUNTER (OUTPATIENT)
Dept: RESPIRATORY THERAPY | Facility: HOSPITAL | Age: 70
Discharge: HOME | End: 2025-01-09
Payer: COMMERCIAL

## 2025-01-09 DIAGNOSIS — Z01.818 PRE-TRANSPLANT EVALUATION FOR KIDNEY TRANSPLANT: ICD-10-CM

## 2025-01-09 PROCEDURE — 94618 PULMONARY STRESS TESTING: CPT

## 2025-01-14 ENCOUNTER — HOSPITAL ENCOUNTER (OUTPATIENT)
Dept: RADIOLOGY | Facility: CLINIC | Age: 70
Discharge: HOME | End: 2025-01-14
Payer: COMMERCIAL

## 2025-01-14 ENCOUNTER — HOSPITAL ENCOUNTER (OUTPATIENT)
Dept: CARDIOLOGY | Facility: CLINIC | Age: 70
Discharge: HOME | End: 2025-01-14
Payer: COMMERCIAL

## 2025-01-14 DIAGNOSIS — Z01.818 PRE-TRANSPLANT EVALUATION FOR KIDNEY TRANSPLANT: ICD-10-CM

## 2025-01-14 PROCEDURE — 74176 CT ABD & PELVIS W/O CONTRAST: CPT

## 2025-01-14 PROCEDURE — 71046 X-RAY EXAM CHEST 2 VIEWS: CPT

## 2025-01-14 PROCEDURE — 93306 TTE W/DOPPLER COMPLETE: CPT | Performed by: STUDENT IN AN ORGANIZED HEALTH CARE EDUCATION/TRAINING PROGRAM

## 2025-01-14 PROCEDURE — 71046 X-RAY EXAM CHEST 2 VIEWS: CPT | Performed by: STUDENT IN AN ORGANIZED HEALTH CARE EDUCATION/TRAINING PROGRAM

## 2025-01-14 PROCEDURE — 93306 TTE W/DOPPLER COMPLETE: CPT

## 2025-01-16 LAB
AORTIC VALVE MEAN GRADIENT: 9 MMHG
AORTIC VALVE PEAK VELOCITY: 2.16 M/S
AV PEAK GRADIENT: 19 MMHG
AVA (PEAK VEL): 1.89 CM2
AVA (VTI): 1.88 CM2
EJECTION FRACTION APICAL 4 CHAMBER: 57.3
EJECTION FRACTION: 63 %
LEFT ATRIUM VOLUME AREA LENGTH INDEX BSA: 42.1 ML/M2
LEFT VENTRICLE INTERNAL DIMENSION DIASTOLE: 4.4 CM (ref 3.5–6)
LEFT VENTRICULAR OUTFLOW TRACT DIAMETER: 1.9 CM
MITRAL VALVE E/A RATIO: 0.86
RIGHT VENTRICLE FREE WALL PEAK S': 15 CM/S
RIGHT VENTRICLE PEAK SYSTOLIC PRESSURE: 42.2 MMHG
TRICUSPID ANNULAR PLANE SYSTOLIC EXCURSION: 2.4 CM

## 2025-01-23 ENCOUNTER — SOCIAL WORK (OUTPATIENT)
Facility: HOSPITAL | Age: 70
End: 2025-01-23
Payer: MEDICARE

## 2025-01-23 NOTE — PROGRESS NOTES
"  ENCOUNTER    Visit Type Initial Visit  Location: [facility]    What is your primary language? English  Other languages/fluency? None    Barriers to Communication / Understanding:   [] Language [] Vision [] Hearing [] Other      []  Present     Accompanied By: Nephvj Christophe    Organ For Transplant:  Kidney    Ethnicity:       ADLs Fully Independent      Instrumental ADLs Fully Independent      Level of Activity Active      DME Rollator     Knowledge of Health Good      Why Do You Have End Stage Organ Disease Pt reported \"diabetes\".      When did you become aware of your diagnosis? November 2024. Pt reported she had her first transplant August of 2011 at [facility].      Knowledge of Transplant / VAD:  Yes Patient Is Able To Make An Informed Decision    Patient Understands the Risks of Transplant / VAD   Yes Rejection  Yes Infection Yes Complications  Yes Low Risk of Death      Patient Understands Recovery and Follow-Up from Transplant / VAD  Yes Length Of State Yes Appointments  Yes Labs  Yes Rehabilitation      Patient Has Identified Goals of Transplant / VAD Yes  Pt reported \"to get off of dialysis\"    What is your biggest concern? Pt reported none at this time.      If previously denied listing, why were you denied? Please describe that experience and how it is different now. None.      Overall Compliance  good       Amount of Daily Medications: 7   Compliance With Medications good    Managed By Patient  Organized By: Pill Organizer     Have you ever changed the way you take a medication without talking to the doctor?      Understanding Of Medication  good  Compliance With Appointments good    What has your relationship been like with previous medical providers?      Fluid Restriction:   Yes [x] Compliant   oz    Dialysis:  [x] What Dialysis Center [facility] [x] Began November 2024      [x] In Center [] Home Hemo [] Peritoneal       Attendance:  Treatment Attendance Good Treatment " Time MWF 3 hour and 15 minutes    [] Shortened Treatments [] Rescheduled Treatments [] Missed Treatments          Diet:   Patient is compliant with renal restrictions     Patient is compliant with low sodium diet       # of Binders: None  [] # of Binders per meal [] Meals per day      []  # of Binders per Snack [] Snacks per day       Pancreas:  [] Checks blood sugar      times/day     Hypoglycemic Episodes  Outside Interventions      Liver:  Is Lactulose prescribed  Dose:   Times per day:  Is patient compliant       SOCIAL HISTORY  No       Years of Service      Were you involved in active combat?                 Do you receive benefits through the [facility]?        Education:  education: Some College    Literate Yes   Computer literate Yes  Internet access Yes       Sources of Income: Pt reported she receives SSI of $983 monthly  Patient's Current Employment:    [] Full-Time [] Part-Time [] Unemployed [] Retired     []  None [] Not working by choice [x] Not working disabled     [] Short Term Leave   [] Other   Employment History Cleaning of office buildings    Will patient have paid status from employment during recovery N/A Retired      Spouse / SO Current Employment N/A    Will spouse / SO have paid status from employment during recovery       Other Sources of Income       Does patient have financial concerns No     Is patient able to meet current monthly expenses Yes    Resources:      Patient was provided information on transplant fundraising       Insurance:  Primary Insurance [facility] Dual Complete    Secondary Insurance     Prescription Coverage Copay cost per month $0    Comments:     FAMILY SYSTEM    Single Yes    How long   Describe Relationship    How long   Describe Relationship    When    When  In a Relationship   How Long  Describe Relationship        Children:None    Parents:  Raised By Both Biological Parents    Did the patient have contact with the other parent      Mother  Yes Age 56  Cause of Death Diabetes   Father  Yes Age 92  Cause of Death Aging factors        Additional Information    Siblings:  [x] # Biological brother and 1 sister 4 brothers  [] # Half Siblings [] # Step Siblings     Sibling #1 Roopa, lives next door, daily contact  Sibling #2 Manav, lives local, daily contact    Support & Recovery Plan:   Adequate    Primary Support:  Name Christophe Phone [phone]  Age 41  Relationship to Patient Nephew  If employed, can they take time off work Yes   If so, is it paid time off Yes   If not, will this impact your finances No   Did they attend education classes Yes   Do they have other caregiver responsibilities (child or eldercare) No   Do they have their own conditions which may prevent them from providing care for you No  (Medical, psychological, physical limitations)    Are they available on short notice Yes   Are they reliable Yes   Are they responsible Yes   Are they able to understand and process new information Yes   Do they have reliable transportation or will you allow them to use your vehicle Yes   Are they currently involved in your care Yes      Comments: Will support during post-transplant care and transportation    Secondary Support:  Pricilla Blas Phone [phone] Age 72  Relationship to Patient Sister  If employed, can they take time off work Retired   If so, is it paid time off    If not, will this impact your finances No   Did they attend education classes No   Do they have other caregiver responsibilities (child or eldercare) No   Do they have their own conditions which may prevent them from providing care for you No  (Medical, psychological, physical limitations)    Are they available on short notice Yes  Are they reliable Yes  Are they responsible Yes  Are they able to understand and process new information Yes   Do they have reliable transportation or will you allow them to use your vehicle Yes   Are they currently  "involved in your care Yes     Comments: Will support for post-transplantation care and transportation.         How comfortable are you asking for and/or receiving help?      Housing:  Yes Adequate Owns home  Type of Home House  Indicate steps into home/bed/bathroom:  Who all lives with Pt: self  Distance to [facility] 1 hours 10 minutes  Pets none      Transportation:  Yes Adequate  # Licensed Drivers in the Home 0  Does Patient Drive No If not, why Never liked driving   # Reliable Vehicles  Does Patient use Public Transportation No  Does Patient use Medical Transportation No  Comments: Sister and Nephew transport patient    MENTAL HEALTH    Cognition:  No impairment observed / reported    The patient reports their mood as \"good\"     Reported Mental Health Diagnosis  Denied  Family History of Mental Health Diagnosis Denied  What are patient psychosocial stressors Denied       Current Medications:  No  Mental Health Meds   Rx'd by   Sleep Meds   Rx'd by   Pain Meds   Rx'd by     OTC Meds None  Past Mental Health Medications None      Counseling Never      Has patient ever been hospitalized for mental health reasons No   Was the hospitalization voluntary  Duration   Where    When  Describe situation    Discharge Plan for Follow Up  Was Discharge Plan Completed   Referral to Transplant Psych No       Suicide Assessment:  History of Suicide Ideation No  [] Timeframe     Frequency   Plan Created  Intent to Follow Through  Outcome      History of Suicide Attempt No       History of Suicidal Ideation in the past 3 months No Intensity   Duration     Description of Plan      Plans thought of:   Intent to Follow Through:     Current Plan for Safety    Plan for Follow-Up        In the past 6 months, has anyone physically, sexually, or emotionally abused you? Denied     Ever in the past, has anyone physically, sexually, or emotionally abused you? Denied    Patient's Reported Trauma History Denied     Does Patient Feel Safe in Home " "Yes        What are patient's coping behaviors Pt reported \"watching TV, reading, being out doors, cooking, and baking\".    Do you have any activities that you're unable to do now, that you hope to return to after transplant? None    Advent / Spirituality Restorationism     Do you have any Episcopalian, ethical, or personal objections to accepting blood products, surgery, and/or transplant? None      Thought Processes:   Attitude toward interviewer Cooperative and Appropriate    Eye Contact Patient maintained good eye contact throughout appointment    Appearance The patient was neatly groomed, appropriately dressed and adequately nourished    Affect Appropriate    Thought Process Appropriate      Substance Use /Abuse History:    Current Tobacco User No  Patient uses   Tobacco Frequency   For How Long      Former Tobacco User No  Describe past tobacco use and date quit      Current Alcohol User No  Type of Alcohol Used   Amount  Frequency   Pattern of Alcohol Use      If alcohol use disorder is suspected, ask the following:   Continued to use the substance despite being told the substance is affecting their health    History of problems at work, school or home due to substance use    History of DUI's/legal issues related to substance use       Former Alcohol User No  Describe past alcohol use and date quit      Has patient ever gone to CD treatment No  If yes, When, Where and What type of Program  Attends AA meetings    Sponsor  Do support people drink alcohol   If yes, describe support people's use  Is alcohol kept in the home   Does Patient need to sign a CD contract No      Current Illegal / Unprescribed Drug User No  Type of Illegal Drug Used   Frequency  Pattern of Drug Use      Illegal / Unprescribed Drug #2  Type of Illegal Drug Used   Frequency  Pattern of Drug Use      Continue to use the substance despite being told the substance is affecting their health    History of problems at work, school or home due to " "substance use      Former Illegal / Unprescribed Drug User No  Describe past illegal drug use and date quit      Has patient ever gone to CD treatment No  If yes, When, Where and What type of Program   Attends AA/NA  meetings    Is patient on a Methadone / Suboxone regiment No  Do support people use illegal drugs   If yes, describe support people's use  Are illegal drugs kept in the home   Does Patient need to sign a CD contract No      Prescription Drug Abuse:  No Has patient experienced feelings of addiction  No Has patient experienced symptoms of withdrawal  No Has patient experienced any side effects? e.g.  hallucinations or delusions    Does Patient Meet the Criteria for Alcohol Use Disorder No Diagnosis  Does Patient Meet [facility] guidelines Yes  Does Patient Meet the Criteria for Illegal Drug Use Disorder No Diagnosis  Does Patient Meet [facility] guidelines Yes    [facility] Substance Relapse Risk Factors   DSM-5 Severity Factors:       LEGAL ISSUES  No Arrests   Currently probation or parole    halfway   When   How long   Where       Citizenship:   Where were you born? [place]   If outside of , where?   What year did you move to the [place]?     Yes [place] Citizen   Green Card  Visa    Any outstanding citizenship concerns?      Advance Directives: No  HCPOA Requested Copy  Living Will Requested Copy  Who is the proxy? Nephew Christophe    Guardian:      TANIKA TUCKER met with Pt and her nephew Christophe in exam room for initial psychosocial assessment. Pt was pleasant and engaged. Pt reported her insurance as [employer]. Pt demonstrated a excellent understanding of the transplant process. Pt reported if approved this would be her second kidney transplant, she received her first kidney transplant at [facility] in August of 2011. Pt denied any current financial concerns. Pt shared the cause of their kidney disease is \"diabetes\". Pt reported their goals of transplant as \"to get off dialysis\". Pt reported good " "compliance with their medications, medical appointments, and dialysis treatments. Pt listed her nephew Christophe as primary support and sister Roopa as secondary support. Pt reported her sister lives next door to her and she will be staying with her the first week she is discharged or her sister will come to stay with her. Pt also reported her nephew and or her sister are her means of transportation due to her not driving. Pt reports both supports are adequate, Pt's nephew reported he can take time from work if needed, and patient's sister is retired, they both drive, and have a reliable vehicles. SW to call and confirm secondary support commitment. Pt reported her mood as \"good\". Pt denied any current/past MH concerns/diagnosis. Pt denied any current/past MH treatment. SW offered psychotherapy resources, pt declined interest or need at this time. Pt denied any current/past hospitalizations for MH. Pt denied any current/past SI/SA. Pt scored a 0 on the PHQ-9, indicating no clinical depression. Pt scored a 0 on the ZAYDA-7, indicating no clinical anxiety. Pt denied any current/past tobacco, alcohol, or illicit drug use. Pt scored a 6 on the SIPAT, indicating Pt is a excellent candidate for transplant. SW would recommend Pt for listing, while monitoring risk factors. Risk factors include SW need to confirm secondary support due to patient not being a .       PLAN  SW will contact secondary support to confirm commitment. SW will follow up with patient annually.   "

## 2025-01-29 ENCOUNTER — DOCUMENTATION (OUTPATIENT)
Facility: HOSPITAL | Age: 70
End: 2025-01-29
Payer: MEDICARE

## 2025-01-29 NOTE — PROGRESS NOTES
SW attempted to reach Pt's secondary support her sister Nirali via telephone call to confirm commitment. SW was unable to speak with Pt's secondary support and left VM with SW contact information.    Plan: SW to await return phone call.

## 2025-02-06 ENCOUNTER — COMMITTEE REVIEW (OUTPATIENT)
Dept: TRANSPLANT | Facility: HOSPITAL | Age: 70
End: 2025-02-06
Payer: MEDICARE

## 2025-02-06 NOTE — COMMITTEE REVIEW
Evaluation Date: 12/4/2024   Committee Review Date: 2/6/2025   Organ being evaluated for: Kidney     Transplant Phase:  Evaluation   Transplant Status: Active     Referring Physician:     Transplant Physician: Pieter Eaton     Primary Diagnosis:      Committee Members:   Aquilino Herrera   Lab Seble Dietrich, [place]   Pharmacy Patt De Paz PharmD   Psychology rAiella Bean, PhD    [facility]   Transplant Alecia Michele RN; Raeann Rojas RN; Venecia Albrecht RN; Charito Clement, PROMISE; Sherry Gonzalez RN   Transplant Nephrology Eric Aguilar MD; Janet Koehler MD; Radha Randle MD   Transplant Surgery Jannie Khan MD; Gay Mondragon MD; Pam Mehta MD; Lamont Burkett MD       Eligibility:  None     Relative contraindications:  Pulmonary Disease     Absolute contraindications:  None         Committee Review Decision:    The candidate's evaluation was presented and discussed at the Transplant Multidisciplinary Selection Conference. After review of the candidate's diagnosis and the evaluations of the multidisciplinary team members, the committee made the following recommendations:     Patient is NOT a candidate for kidney transplant due to the following reasons:     Advanced cardiac disease precluding safe transplant    Patient attests to being fully vaccinated against Hepatitis B: No   Reason(s) for deferral:     Other, specify closed evaluation    Lab Results   Component Value Date    HEPBSAB 132.0 (H) 12/04/2024       There is no immunization history for the selected administration types on file for this patient.

## 2025-02-17 ENCOUNTER — DOCUMENTATION (OUTPATIENT)
Dept: TRANSPLANT | Facility: HOSPITAL | Age: 70
End: 2025-02-17
Payer: COMMERCIAL

## 2025-02-18 ENCOUNTER — APPOINTMENT (OUTPATIENT)
Dept: CARDIOLOGY | Facility: HOSPITAL | Age: 70
End: 2025-02-18
Payer: MEDICARE

## 2025-05-29 ENCOUNTER — APPOINTMENT (OUTPATIENT)
Dept: RADIOLOGY | Facility: CLINIC | Age: 70
End: 2025-05-29
Payer: MEDICARE